# Patient Record
Sex: FEMALE | Race: BLACK OR AFRICAN AMERICAN | NOT HISPANIC OR LATINO
[De-identification: names, ages, dates, MRNs, and addresses within clinical notes are randomized per-mention and may not be internally consistent; named-entity substitution may affect disease eponyms.]

---

## 2022-03-01 PROBLEM — Z00.00 ENCOUNTER FOR PREVENTIVE HEALTH EXAMINATION: Status: ACTIVE | Noted: 2022-03-01

## 2022-03-04 ENCOUNTER — NON-APPOINTMENT (OUTPATIENT)
Age: 72
End: 2022-03-04

## 2022-03-09 ENCOUNTER — NON-APPOINTMENT (OUTPATIENT)
Age: 72
End: 2022-03-09

## 2022-03-09 ENCOUNTER — APPOINTMENT (OUTPATIENT)
Dept: COLORECTAL SURGERY | Facility: CLINIC | Age: 72
End: 2022-03-09
Payer: MEDICARE

## 2022-03-09 VITALS
SYSTOLIC BLOOD PRESSURE: 149 MMHG | TEMPERATURE: 97.8 F | DIASTOLIC BLOOD PRESSURE: 93 MMHG | HEIGHT: 68 IN | WEIGHT: 150 LBS | BODY MASS INDEX: 22.73 KG/M2 | HEART RATE: 73 BPM

## 2022-03-09 DIAGNOSIS — E03.9 HYPOTHYROIDISM, UNSPECIFIED: ICD-10-CM

## 2022-03-09 DIAGNOSIS — I49.9 CARDIAC ARRHYTHMIA, UNSPECIFIED: ICD-10-CM

## 2022-03-09 DIAGNOSIS — Z86.39 PERSONAL HISTORY OF OTHER ENDOCRINE, NUTRITIONAL AND METABOLIC DISEASE: ICD-10-CM

## 2022-03-09 PROCEDURE — 99203 OFFICE O/P NEW LOW 30 MIN: CPT | Mod: 25

## 2022-03-09 PROCEDURE — 46600 DIAGNOSTIC ANOSCOPY SPX: CPT

## 2022-03-09 RX ORDER — HYDROCORTISONE 25 MG/G
2.5 CREAM TOPICAL
Qty: 1 | Refills: 3 | Status: ACTIVE | COMMUNITY
Start: 2022-03-09 | End: 1900-01-01

## 2022-03-09 RX ORDER — ATORVASTATIN CALCIUM 80 MG/1
TABLET, FILM COATED ORAL
Refills: 0 | Status: ACTIVE | COMMUNITY

## 2022-03-09 NOTE — HISTORY OF PRESENT ILLNESS
[FreeTextEntry1] : 70 y/o F presents to the office for initial evaluation of hemorrhoids. \par PMH hypothyroidism on Synthroid 2/2, ?arrhythmia followed annually by Cards Dr. Moya (no interventions required)\par PSH foot surgery\par \par Pt noticed scant BRB noted on TP approximately 3 months ago in setting of straining if she drank less water. Reports bleeding has been more frequent the last two weeks and felt small pink lump in anal area. Felt slight discomfort  and itching which resolved after manually reducing tissue and cleaning self w/ wet wipe. Admits to one episode of fecal urgency and stool leakage this past week as she wasn't able to get to toilet in time\par \par Denies pain or itching at present.\par \par BH: typically once daily, now 4 times daily for the last 5-6 months. Pt attributes this to changing eating habits and she has avoided refined grains, but now eating more fruits/vegetables and whole grains if she eats \par Denies use of stool softeners, fiber supplements or laxatives\par Reports adequate dietary fiber intake, water, seltzer and coconut water and drinks one 16 oz 1/2 decaf coffee\par \par Denies FMH of colorectal cancer\par Last colonoscopy 6 months w/ Dr. Storey, otherwise normal per patient\par Denies ASA/NSAIDs use in the last 7 days. Takes Turmeric supplement

## 2022-03-09 NOTE — PHYSICAL EXAM
[FreeTextEntry1] : Medical assistant present for duration of physical examination\par \par General no acute distress, alert and oriented\par Psych calm, pleasant demeanor, responding appropriately to questions\par Nonlabored breathing\par Ambulating without assistance\par Skin normal color and pigment, no visible lesions or rashes\par \par Anorectal Exam:\par Inspection no erythema, induration or fluctuance, no skin excoriation, no fissure, small residual external hemorrhoidal tags anterior and posterior without inflammation\par HARRIS nontender, no masses palpated, no blood on gloved finger\par \par Procedure: Anoscopy\par \par Pre procedure Diagnosis: hemorrhoids\par Post procedure Diagnosis: hemorrhoids\par Anesthesia: none\par Estimated blood loss: none\par Specimen: none\par Complications: none\par \par Consent obtained. Anoscopy was performed by passing a lighted anoscope with lubricant jelly into the anal canal and the entire anal mucosal surface was inspected. Findings included no fissure, mild to moderate internal hemorrhoids - most prominent left lateral - without inflammation, no visible masses or lesions in anal canal\par \par Patient tolerated examination and procedure well.\par \par \par

## 2022-03-09 NOTE — ASSESSMENT
[FreeTextEntry1] : Exam findings and diagnosis were discussed at length with patient. \par Recommendations including increased fiber intake, adequate daily hydration, stool softeners as needed, and sitz baths as needed and after bowel movements were discussed.\par Avoid constipation and diarrhea, avoid pushing/straining. Avoid associated triggers - patient thinks spicy foods aggravate her symptoms. \par Recommend hydrocortisone as needed if symptoms return.\par If symptoms persist or worsen, recommend follow up evaluation and possible consideration for office based treatment, such as rubber band ligation.\par All questions answered, patient expressed understanding and is agreeable to this plan.\par

## 2022-08-02 ENCOUNTER — APPOINTMENT (OUTPATIENT)
Dept: RHEUMATOLOGY | Facility: CLINIC | Age: 72
End: 2022-08-02

## 2022-08-02 VITALS
WEIGHT: 154 LBS | HEART RATE: 82 BPM | SYSTOLIC BLOOD PRESSURE: 131 MMHG | TEMPERATURE: 97.5 F | OXYGEN SATURATION: 98 % | HEIGHT: 68 IN | DIASTOLIC BLOOD PRESSURE: 83 MMHG | BODY MASS INDEX: 23.34 KG/M2

## 2022-08-02 DIAGNOSIS — Z83.3 FAMILY HISTORY OF DIABETES MELLITUS: ICD-10-CM

## 2022-08-02 DIAGNOSIS — Z78.9 OTHER SPECIFIED HEALTH STATUS: ICD-10-CM

## 2022-08-02 DIAGNOSIS — Z13.21 ENCOUNTER FOR SCREENING FOR NUTRITIONAL DISORDER: ICD-10-CM

## 2022-08-02 DIAGNOSIS — M85.80 OTHER SPECIFIED DISORDERS OF BONE DENSITY AND STRUCTURE, UNSPECIFIED SITE: ICD-10-CM

## 2022-08-02 DIAGNOSIS — M25.561 PAIN IN RIGHT KNEE: ICD-10-CM

## 2022-08-02 DIAGNOSIS — Z77.22 CONTACT WITH AND (SUSPECTED) EXPOSURE TO ENVIRONMENTAL TOBACCO SMOKE (ACUTE) (CHRONIC): ICD-10-CM

## 2022-08-02 PROCEDURE — 36415 COLL VENOUS BLD VENIPUNCTURE: CPT

## 2022-08-02 PROCEDURE — 99204 OFFICE O/P NEW MOD 45 MIN: CPT | Mod: 25

## 2022-08-02 RX ORDER — AMOXICILLIN 500 MG/1
500 CAPSULE ORAL
Qty: 21 | Refills: 0 | Status: COMPLETED | COMMUNITY
Start: 2022-04-05

## 2022-08-02 RX ORDER — LEVOTHYROXINE SODIUM 0.1 MG/1
100 TABLET ORAL
Qty: 90 | Refills: 0 | Status: ACTIVE | COMMUNITY
Start: 2022-05-30

## 2022-08-02 NOTE — DATA REVIEWED
[FreeTextEntry1] : Bone density May 16, 2022\par Lumbar spine T score -2.1\par left Femoral neck T score -0.3\par Left Total hip T score -0.5\par Right femoral neck T score -0.9\par Right total hip T score -0.6\par \par EXAM:  MRI RIGHT SHOULDER WITHOUT CONTRAST\par \par HISTORY:  Worsening right shoulder pain and limited range of motion.\par \par TECHNIQUE:  Multiplanar, multisequence noncontrast MRI of the right shoulder was obtained on a 3T scanner according to standard protocol.\par \par COMPARISON:  Prior right shoulder MRI of 6/17/2015.\par \par FINDINGS:\par \par Rotator cuff: Intact subscapularis tendon. Small near full-thickness articular surface tear of the distal supraspinatus tendon the lateral acromial margin (series 301, image 13, series 401 images 14-15. Partial-thickness interstitial delaminating tears of the anterior fibers of the supraspinatus tendon more proximally (series 401, image 10). Small moderate grade partial-thickness articular surface tear of the distal infraspinatus tendon just proximal to the greater tuberosity insertion (sagittal series 401, image 14, oblique series 701, image 15). Intact teres minor tendon. No atrophy of the rotator cuff musculature.\par \par Glenohumeral joint: Superior labral tear/SLAP lesion involving the long head biceps tendon anchor. Anteroinferior labral tear generating a small para labral cyst. Posterior/posteroinferior labral tear. Diffuse full-thickness chondral loss involving the humeral head. Localized chondral fissuring along the anterior glenoid fossa. Hypertrophic spurring at the humeral head articular margins. Small glenohumeral joint effusion/synovitis.\par \par Long biceps tendon and rotator cuff interval: Long head biceps tenosynovitis, with the tendon in normal position within the bicipital groove. Normal rotator cuff interval.\par \par Acromioclavicular joint and rotator cuff outlet: Mild AC joint arthrosis and joint synovitis. Laterally downsloping anterior acromion is imaging hypertrophic spurring along the acromial undersurface.\par \par Bursae: Inflammatory changes of the subacromial-subdeltoid bursa. \par \par Osseous structures: Bony cystic changes along the greater tuberosity of the humerus anteriorly. No acute osseous abnormalities.\par \par Other: Normal quadrilateral and axillary spaces. Normal visualized deltoid, pectoralis major and minor, coracobrachialis, teres major, and lateral and long heads of the triceps muscles.\par \par IMPRESSION:  MRI of the right shoulder demonstrates:\par \par 1. Small, near full-thickness tear of the supraspinatus tendon, partial-thickness interstitial delaminating tear of the anterior fibers of the supraspinatus tendon more proximally, small partial-thickness tear of the distal infraspinatus tendon\par 2. Superior labral tear/SLAP lesion involving the long head biceps tendon anchor, anteroinferior labral tear, posterior/posteroinferior labral tear\par 3. Glenohumeral arthrosis including extensive full-thickness chondral loss involving the humeral head\par 4. Long head biceps tenosynovitis\par 5. Inflammatory changes of the subacromial-subdeltoid bursa\par 6. Mild AC joint arthrosis and hypertrophic spurring beneath a laterally downsloping anterior acromion\par 7. Small glenohumeral joint effusion/synovitis\par \par Thank you for the opportunity to participate in the care of this patient.  \par  \par KIM MCKEON MD  - Electronically Signed: 12- 11:16 AM \par Physician to Physician Direct Line is: [phone]\par \par XAM:  MRI LEFT SHOULDER WITHOUT CONTRAST\par \par HISTORY:  Left shoulder pain and limited range of motion.\par \par TECHNIQUE:  Multiplanar, multisequence noncontrast MRI of the left show was obtained on a 3T scanner according to standard protocol.\par \par COMPARISON:  None.\par \par FINDINGS:\par \par Rotator cuff: Intact subscapularis tendon. Partial-thickness interstitial tear of the supraspinatus tendon footprint (coronal series 501, image 13). Low-grade partial-thickness interstitial delaminating tear of the supraspinatus tendon more proximally. Underlying tendinosis and bursal surface fraying of the supraspinatus tendon. Low-grade partial-thickness articular surface tear of the distal infraspinatus tendon just proximal to the greater tuberosity insertion (coronal series 501, image 17). Underlying mild infraspinatus tendinosis. Intact teres minor tendon. No atrophy of the rotator cuff musculature.\par \par Glenohumeral joint: Superior labral tear/SLAP lesion involving the long head biceps tendon anchor and the posterosuperior labrum. Tear of the posteroinferior/inferior labrum. Segments of high-grade glenohumeral chondral loss involving the inferior glenoid and inferomedial humeral head. Minimal glenohumeral intra-articular fluid/synovitis.\par \par Long biceps tendon and rotator cuff interval: Long head biceps tendon in normal position within the bicipital groove. Normal rotator cuff interval.\par \par Acromioclavicular joint and rotator cuff outlet: Mild AC joint arthrosis. Slight lateral downslope of the anterior acromion exhibiting hypertrophic changes along the undersurface.\par \par Bursae: Thickened and inflamed subacromial-subdeltoid bursa. \par \par Osseous structures: Generally normal bone marrow signals. There are no fractures.\par \par Other: Normal quadrilateral and axillary spaces. Normal visualized deltoid, pectoralis major and minor, coracobrachialis, teres major, and lateral and long heads of the triceps muscles.\par \par IMPRESSION:  MRI of the left shoulder demonstrates:\par \par 1. Partial-thickness tears of the supraspinatus and infraspinatus tendons, underlying tendinosis\par 2. Superior labral tear/SLAP lesion involving the long head biceps anchor and posterosuperior labrum, posteroinferior/inferior labral tear\par 3. Segments of high-grade glenohumeral chondral loss\par 4. Inflammatory changes of the subacromial-subdeltoid bursa\par 5. Mild AC joint arthrosis and hypertrophic changes beneath a mildly laterally downsloping anterior acromion.\par \par Thank you for the opportunity to participate in the care of this patient.  \par  \par KIM MCKEON MD  - Electronically Signed: 12- 11:32 AM \par Physician to Physician Direct Line is: [phone]

## 2022-08-02 NOTE — PHYSICAL EXAM
[General Appearance - Alert] : alert [General Appearance - In No Acute Distress] : in no acute distress [General Appearance - Well Nourished] : well nourished [General Appearance - Well Developed] : well developed [General Appearance - Well-Appearing] : healthy appearing [Sclera] : the sclera and conjunctiva were normal [Respiration, Rhythm And Depth] : normal respiratory rhythm and effort [Exaggerated Use Of Accessory Muscles For Inspiration] : no accessory muscle use [Edema] : there was no peripheral edema [Abnormal Walk] : normal gait [Musculoskeletal - Swelling] : no joint swelling seen [] : no rash [Oriented To Time, Place, And Person] : oriented to person, place, and time [Impaired Insight] : insight and judgment were intact [Affect] : the affect was normal [Mood] : the mood was normal [FreeTextEntry1] : No psoriasis

## 2022-08-02 NOTE — HISTORY OF PRESENT ILLNESS
[FreeTextEntry1] : Patient referred by Dr. Storey\par \par Right shoulder with decreased ROM, can actively lift the right shoulder to less than 90', then can increase a little bit more passively with assistance of the left upper extremity\par Symptoms present for the past  two months, although likely present for longer period of time as had MRI in 2020 for the right shoulder as well.\par Had issues with right knee, seen by orthopedist, but improved with PT, did not need surgery\par \par Right shoulder has had issues previously, follows with orthopedist, treated with steroid injections intermittently.\par Years ago was at gym, felt overstretched the shoulders and developed pain in the bilateral shoulders \par Patient is right-hand dominant, trying to do more things with the left hand but limited\par Has received injections for the past \par Had steroid injection in the right shoulder, by orthopedist, patient is unclear when but in the past few months, calendar at home, unclear when had injection, but did not feel benefit and was told by orthopedist unable to administer further injections in the right shoulder or the right knee.\par Injection for the knee in addition to physical therapy helped no longer needed surgery, but injection for the shoulder did not provide relief, has not tried physical therapy to date\par \par Orthopedist: Dr. Medina [address] \par [phone]\par \par Bone density reviewed with patient\par Walks for exercises, ellipitcal \par Retired from the [employer] department\par \par No PT for the shoulder\par No medications for the pain\par \par Tried something online for shoulder and knee\par \par history of toe fracture after falling, s/p surgery more than 15 years ago\par Takes vitamins\par Folic acid, b vitamins, d vitamin, vitamin c\par No history of covid\par Healthier, lost weight about five years\par Exercise regularly, walking and elliptical\par No falls\par \par No personal history of smoking\[place] [facility] [place] [facility] [employer], was around smoke as child

## 2022-08-02 NOTE — ASSESSMENT
[FreeTextEntry1] : 71 year old woman with right shoulder pain with greatly reduced range of motion referred for evaluation.  Patient with decreased ROM of the right shoulder with previous MRI completed in 2020 with RTC tears and SLAP lesion and glenohumeral arthrosis with full thickness chondral loss.  MRI appears degenerative in etiology, however given involvement of the left shoulder as well, will obtain labs today to evaluate for possible underlying inflammatory arthritis, will check RF, CCP, ESR, CRP,  CBC, and CMP.  WIll repeat MRI of the right shoulder for progression, would follow up with orthopedist as well, may be candidate for surgical intervention at this time given degree of disability of the right upper extremity.  Reviewed bone density osteopenia noted, with FRAX score of 6/27% and 0.71% respectively, will check vitamin D level today as well. Continue weight bearing exercises as well as calcium and vitamin D supplementation, fall precautions discussed. Further management pending results.

## 2022-08-03 LAB
25(OH)D3 SERPL-MCNC: 49.5 NG/ML
ALBUMIN SERPL ELPH-MCNC: 4.9 G/DL
ALP BLD-CCNC: 87 U/L
ALT SERPL-CCNC: 20 U/L
ANION GAP SERPL CALC-SCNC: 11 MMOL/L
AST SERPL-CCNC: 23 U/L
BASOPHILS # BLD AUTO: 0.03 K/UL
BASOPHILS NFR BLD AUTO: 0.8 %
BILIRUB SERPL-MCNC: 0.5 MG/DL
BUN SERPL-MCNC: 13 MG/DL
CALCIUM SERPL-MCNC: 9.4 MG/DL
CHLORIDE SERPL-SCNC: 101 MMOL/L
CO2 SERPL-SCNC: 27 MMOL/L
CREAT SERPL-MCNC: 0.9 MG/DL
CRP SERPL-MCNC: <3 MG/L
EGFR: 68 ML/MIN/1.73M2
EOSINOPHIL # BLD AUTO: 0.06 K/UL
EOSINOPHIL NFR BLD AUTO: 1.5 %
ERYTHROCYTE [SEDIMENTATION RATE] IN BLOOD BY WESTERGREN METHOD: 27 MM/HR
GLUCOSE SERPL-MCNC: 88 MG/DL
HCT VFR BLD CALC: 44.2 %
HGB BLD-MCNC: 14.7 G/DL
IMM GRANULOCYTES NFR BLD AUTO: 0.5 %
LYMPHOCYTES # BLD AUTO: 1.09 K/UL
LYMPHOCYTES NFR BLD AUTO: 27.3 %
MAN DIFF?: NORMAL
MCHC RBC-ENTMCNC: 29.8 PG
MCHC RBC-ENTMCNC: 33.3 GM/DL
MCV RBC AUTO: 89.7 FL
MONOCYTES # BLD AUTO: 0.46 K/UL
MONOCYTES NFR BLD AUTO: 11.5 %
NEUTROPHILS # BLD AUTO: 2.33 K/UL
NEUTROPHILS NFR BLD AUTO: 58.4 %
PLATELET # BLD AUTO: 293 K/UL
POTASSIUM SERPL-SCNC: 4.3 MMOL/L
PROT SERPL-MCNC: 7.4 G/DL
RBC # BLD: 4.93 M/UL
RBC # FLD: 14.7 %
RHEUMATOID FACT SER QL: <10 IU/ML
SODIUM SERPL-SCNC: 139 MMOL/L
WBC # FLD AUTO: 3.99 K/UL

## 2022-08-08 LAB
CCP AB SER IA-ACNC: <8 UNITS
RF+CCP IGG SER-IMP: NEGATIVE

## 2022-09-01 ENCOUNTER — APPOINTMENT (OUTPATIENT)
Dept: RHEUMATOLOGY | Facility: CLINIC | Age: 72
End: 2022-09-01

## 2022-09-01 VITALS
TEMPERATURE: 97.4 F | OXYGEN SATURATION: 98 % | HEART RATE: 61 BPM | WEIGHT: 154 LBS | DIASTOLIC BLOOD PRESSURE: 86 MMHG | HEIGHT: 68 IN | BODY MASS INDEX: 23.34 KG/M2 | SYSTOLIC BLOOD PRESSURE: 154 MMHG

## 2022-09-01 DIAGNOSIS — M25.512 PAIN IN LEFT SHOULDER: ICD-10-CM

## 2022-09-01 DIAGNOSIS — G89.29 PAIN IN RIGHT SHOULDER: ICD-10-CM

## 2022-09-01 DIAGNOSIS — M25.619 STIFFNESS OF UNSPECIFIED SHOULDER, NOT ELSEWHERE CLASSIFIED: ICD-10-CM

## 2022-09-01 DIAGNOSIS — M25.511 PAIN IN RIGHT SHOULDER: ICD-10-CM

## 2022-09-01 DIAGNOSIS — G89.29 PAIN IN LEFT SHOULDER: ICD-10-CM

## 2022-09-01 PROCEDURE — 99213 OFFICE O/P EST LOW 20 MIN: CPT

## 2022-09-01 RX ORDER — LEVOTHYROXINE SODIUM 137 UG/1
TABLET ORAL
Refills: 0 | Status: DISCONTINUED | COMMUNITY
End: 2022-09-01

## 2022-09-01 RX ORDER — MELOXICAM 15 MG/1
15 TABLET ORAL
Qty: 14 | Refills: 1 | Status: ACTIVE | COMMUNITY
Start: 2022-09-01 | End: 1900-01-01

## 2022-09-01 NOTE — PHYSICAL EXAM
[General Appearance - Alert] : alert [General Appearance - In No Acute Distress] : in no acute distress [General Appearance - Well-Appearing] : healthy appearing [Sclera] : the sclera and conjunctiva were normal [Examination Of The Oral Cavity] : the lips and gums were normal [Oropharynx] : the oropharynx was normal [Neck Appearance] : the appearance of the neck was normal [No Spinal Tenderness] : no spinal tenderness [Abnormal Walk] : normal gait [] : no rash [Skin Lesions] : no skin lesions [Oriented To Time, Place, And Person] : oriented to person, place, and time [Impaired Insight] : insight and judgment were intact [Affect] : the affect was normal [FreeTextEntry1] : Decreased range of motion of the right shoulder in all directions, no other joints involved at this time.  Crepitus of the bilateral knees right more than left

## 2022-09-01 NOTE — HISTORY OF PRESENT ILLNESS
[FreeTextEntry1] : Patient referred by Dr. Storey\par \par August 2, 2022\par Right shoulder with decreased ROM, can actively lift the right shoulder to less than 90', then can increase a little bit more passively with assistance of the left upper extremity\par Symptoms present for the past  two months, although likely present for longer period of time as had MRI in 2020 for the right shoulder as well.\par Had issues with right knee, seen by orthopedist, but improved with PT, did not need surgery\par \par Right shoulder has had issues previously, follows with orthopedist, treated with steroid injections intermittently.\par Years ago was at gym, felt overstretched the shoulders and developed pain in the bilateral shoulders \par Patient is right-hand dominant, trying to do more things with the left hand but limited\par Has received injections for the past \par Had steroid injection in the right shoulder, by orthopedist, patient is unclear when but in the past few months, calendar at home, unclear when had injection, but did not feel benefit and was told by orthopedist unable to administer further injections in the right shoulder or the right knee.\par Injection for the knee in addition to physical therapy helped no longer needed surgery, but injection for the shoulder did not provide relief, has not tried physical therapy to date\par \par Orthopedist: Dr. Medina [address] \par [phone]\par \par Bone density reviewed with patient\par Walks for exercises, ellipitcal \par Retired from the [employer] department\par \par No PT for the shoulder\par No medications for the pain\par \par Tried something online for shoulder and knee\par \par history of toe fracture after falling, s/p surgery more than 15 years ago\par Takes vitamins\par Folic acid, b vitamins, d vitamin, vitamin c\par No history of covid\par Healthier, lost weight about five years\par Exercise regularly, walking and elliptical\par No falls\par \par No personal history of smoking\[facility], was around smoke as child\par \par September 1, 2022\par Patient returns for follow up \par Patient with right shoulder pain\par Reviewed MRI results with patient\par Reviewed blood test results with patient\par Patient was recommended for surgery by orthopedist, but would like to hold off\par Pain in the right shoulder limits sleeping and limits activities with the right shoulder as range of motion greatly limited\par Discussed anti-inflammatories, trial of meloxicam\par Patient would like to try physical therapy as greatly helped with the right knee pain\par \par

## 2022-09-01 NOTE — DATA REVIEWED
[FreeTextEntry1] : Bone density May 16, 2022\par Lumbar spine T score -2.1\par left Femoral neck T score -0.3\par Left Total hip T score -0.5\par Right femoral neck T score -0.9\par Right total hip T score -0.6\par \par EXAM:  MRI RIGHT SHOULDER WITHOUT CONTRAST\par \par HISTORY:  Worsening right shoulder pain and limited range of motion.\par \par TECHNIQUE:  Multiplanar, multisequence noncontrast MRI of the right shoulder was obtained on a 3T scanner according to standard protocol.\par \par COMPARISON:  Prior right shoulder MRI of 6/17/2015.\par \par FINDINGS:\par \par Rotator cuff: Intact subscapularis tendon. Small near full-thickness articular surface tear of the distal supraspinatus tendon the lateral acromial margin (series 301, image 13, series 401 images 14-15. Partial-thickness interstitial delaminating tears of the anterior fibers of the supraspinatus tendon more proximally (series 401, image 10). Small moderate grade partial-thickness articular surface tear of the distal infraspinatus tendon just proximal to the greater tuberosity insertion (sagittal series 401, image 14, oblique series 701, image 15). Intact teres minor tendon. No atrophy of the rotator cuff musculature.\par \par Glenohumeral joint: Superior labral tear/SLAP lesion involving the long head biceps tendon anchor. Anteroinferior labral tear generating a small para labral cyst. Posterior/posteroinferior labral tear. Diffuse full-thickness chondral loss involving the humeral head. Localized chondral fissuring along the anterior glenoid fossa. Hypertrophic spurring at the humeral head articular margins. Small glenohumeral joint effusion/synovitis.\par \par Long biceps tendon and rotator cuff interval: Long head biceps tenosynovitis, with the tendon in normal position within the bicipital groove. Normal rotator cuff interval.\par \par Acromioclavicular joint and rotator cuff outlet: Mild AC joint arthrosis and joint synovitis. Laterally downsloping anterior acromion is imaging hypertrophic spurring along the acromial undersurface.\par \par Bursae: Inflammatory changes of the subacromial-subdeltoid bursa. \par \par Osseous structures: Bony cystic changes along the greater tuberosity of the humerus anteriorly. No acute osseous abnormalities.\par \par Other: Normal quadrilateral and axillary spaces. Normal visualized deltoid, pectoralis major and minor, coracobrachialis, teres major, and lateral and long heads of the triceps muscles.\par \par IMPRESSION:  MRI of the right shoulder demonstrates:\par \par 1. Small, near full-thickness tear of the supraspinatus tendon, partial-thickness interstitial delaminating tear of the anterior fibers of the supraspinatus tendon more proximally, small partial-thickness tear of the distal infraspinatus tendon\par 2. Superior labral tear/SLAP lesion involving the long head biceps tendon anchor, anteroinferior labral tear, posterior/posteroinferior labral tear\par 3. Glenohumeral arthrosis including extensive full-thickness chondral loss involving the humeral head\par 4. Long head biceps tenosynovitis\par 5. Inflammatory changes of the subacromial-subdeltoid bursa\par 6. Mild AC joint arthrosis and hypertrophic spurring beneath a laterally downsloping anterior acromion\par 7. Small glenohumeral joint effusion/synovitis\par \par Thank you for the opportunity to participate in the care of this patient.  \par  \par KIM MCKEON MD  - Electronically Signed: 12- 11:16 AM \par Physician to Physician Direct Line is: [phone]\par \par XAM:  MRI LEFT SHOULDER WITHOUT CONTRAST\par \par HISTORY:  Left shoulder pain and limited range of motion.\par \par TECHNIQUE:  Multiplanar, multisequence noncontrast MRI of the left show was obtained on a 3T scanner according to standard protocol.\par \par COMPARISON:  None.\par \par FINDINGS:\par \par Rotator cuff: Intact subscapularis tendon. Partial-thickness interstitial tear of the supraspinatus tendon footprint (coronal series 501, image 13). Low-grade partial-thickness interstitial delaminating tear of the supraspinatus tendon more proximally. Underlying tendinosis and bursal surface fraying of the supraspinatus tendon. Low-grade partial-thickness articular surface tear of the distal infraspinatus tendon just proximal to the greater tuberosity insertion (coronal series 501, image 17). Underlying mild infraspinatus tendinosis. Intact teres minor tendon. No atrophy of the rotator cuff musculature.\par \par Glenohumeral joint: Superior labral tear/SLAP lesion involving the long head biceps tendon anchor and the posterosuperior labrum. Tear of the posteroinferior/inferior labrum. Segments of high-grade glenohumeral chondral loss involving the inferior glenoid and inferomedial humeral head. Minimal glenohumeral intra-articular fluid/synovitis.\par \par Long biceps tendon and rotator cuff interval: Long head biceps tendon in normal position within the bicipital groove. Normal rotator cuff interval.\par \par Acromioclavicular joint and rotator cuff outlet: Mild AC joint arthrosis. Slight lateral downslope of the anterior acromion exhibiting hypertrophic changes along the undersurface.\par \par Bursae: Thickened and inflamed subacromial-subdeltoid bursa. \par \par Osseous structures: Generally normal bone marrow signals. There are no fractures.\par \par Other: Normal quadrilateral and axillary spaces. Normal visualized deltoid, pectoralis major and minor, coracobrachialis, teres major, and lateral and long heads of the triceps muscles.\par \par IMPRESSION:  MRI of the left shoulder demonstrates:\par \par 1. Partial-thickness tears of the supraspinatus and infraspinatus tendons, underlying tendinosis\par 2. Superior labral tear/SLAP lesion involving the long head biceps anchor and posterosuperior labrum, posteroinferior/inferior labral tear\par 3. Segments of high-grade glenohumeral chondral loss\par 4. Inflammatory changes of the subacromial-subdeltoid bursa\par 5. Mild AC joint arthrosis and hypertrophic changes beneath a mildly laterally downsloping anterior acromion.\par \par Thank you for the opportunity to participate in the care of this patient.  \par  \par KIM MCKEON MD  - Electronically Signed: 12- 11:32 AM \par Physician to Physician Direct Line is: [phone]

## 2022-09-01 NOTE — REVIEW OF SYSTEMS
[Joint Pain] : joint pain [Joint Stiffness] : joint stiffness [Negative] : Heme/Lymph [FreeTextEntry9] : Right shoulder

## 2022-09-01 NOTE — ASSESSMENT
[FreeTextEntry1] : 71 year old woman with right shoulder pain with greatly reduced range of motion returns for follow-up.  Patient with decreased ROM of the right shoulder with previous MRI completed in 2020 with RTC tears and SLAP lesion and glenohumeral arthrosis with full thickness chondral loss.  Previous MRI appeared degenerative in etiology, repeat MRI shows interval progression of a moderate grade partial interstitial tear of the infraspinatus tendon at the anterior insertion mild superior rotator cuff tendinosis.  With moderate glenohumeral joint osteoarthritis with diffuse labral degeneration and degenerative tearing.  Also noted AC joint mild OA as well as mild subacromial subdeltoid bursitis.  Blood test without evidence of an inflammatory arthritis at this time, rheumatoid factor and CCP negative as well as ESR and CRP in the normal range.  Would recommend follow up with orthopedist as well, may be candidate for surgical intervention at this time given degree of disability of the right upper extremity.  Trial of meloxicam 15 mg daily with food, patient would like to try physical therapy as well prior to surgical intervention.  We will follow-up in 4 weeks to reevaluate or sooner as needed. Again reviewed bone density osteopenia noted, with FRAX score of 6.27% and 0.71% respectively, continue weight bearing exercises as well as calcium and vitamin D supplementation, fall precautions discussed.

## 2023-03-10 ENCOUNTER — RX RENEWAL (OUTPATIENT)
Age: 73
End: 2023-03-10

## 2023-03-10 RX ORDER — HYDROCORTISONE 25 MG/G
2.5 CREAM TOPICAL
Qty: 28 | Refills: 0 | Status: ACTIVE | COMMUNITY
Start: 2023-03-10 | End: 1900-01-01

## 2023-05-02 ENCOUNTER — NON-APPOINTMENT (OUTPATIENT)
Age: 73
End: 2023-05-02

## 2023-05-04 ENCOUNTER — APPOINTMENT (OUTPATIENT)
Dept: COLORECTAL SURGERY | Facility: CLINIC | Age: 73
End: 2023-05-04
Payer: MEDICARE

## 2023-05-04 VITALS
HEIGHT: 68 IN | DIASTOLIC BLOOD PRESSURE: 87 MMHG | SYSTOLIC BLOOD PRESSURE: 145 MMHG | TEMPERATURE: 97.3 F | BODY MASS INDEX: 23.95 KG/M2 | WEIGHT: 158 LBS | HEART RATE: 86 BPM

## 2023-05-04 DIAGNOSIS — K64.8 OTHER HEMORRHOIDS: ICD-10-CM

## 2023-05-04 PROCEDURE — 46600 DIAGNOSTIC ANOSCOPY SPX: CPT

## 2023-05-04 NOTE — HISTORY OF PRESENT ILLNESS
[FreeTextEntry1] : 73 yo F presents for f/u hemorrhoids\par Last colonoscopy ~ 2021 w/ Dr. Storey, otherwise normal per patient\par \par Last seen for initial consultation 3/9/22 w/ reported h/o scant BRB on TP 3 months prior in setting of straining or decreased water intake. More recently had experienced small pink lump associated w/ slight discomfort and itching, improved w/ manual reduction.\par Exam noted small residual external hemorrhoidal tags at anterior and posterior w/o inflammation. Anoscopy noted mild to moderate internal hemorrhoids, most prominent at left lateral, no inflammation.\par \par Pt advised on optimizing bowel habits and increasing dietary fiber and fluid intake, avoidance of foods that trigger constipation. Prescribed HC 2.5% for PRN use, consider rubber band ligation if symptoms worsen.\par \par Pt experienced flare of hemorrhoids last week in setting of likely constipation and straining.  Also had 2 episodes of explosive diarrhea, admits she had been eating lots of cabbage, kale). Pt saw three large painful "balloons" and rectum appeared very swollen. She BRB on TP initially however it has since resolved. Pt used HC cream BID with good effect and opted to keep the appointment for further evaluation.\par Also admits to increased flatulence as well. \par Moving bowels regularly now. Has not been taking Metamucil capsules as she felt it worsened acid reflux.\par Denies ASA/NSAID use

## 2023-05-04 NOTE — ASSESSMENT
[FreeTextEntry1] : Hemorrhoidal flare after episode of diarrhea.\par Resolved with supportive care and hydrocortisone.\par Currently asymptomatic with no acute findings on exam.\par Avoid constipation/diarrhea. Hydrocortisone prn. F/u if symptoms return. \par All questions were answered, patient expressed understanding, and is agreeable to this plan.\par

## 2023-05-04 NOTE — PHYSICAL EXAM
[FreeTextEntry1] : Medical assistant present for duration of physical examination\par \par General no acute distress, alert and oriented\par Psych calm, pleasant, in good spirits\par Nonlabored breathing\par Ambulating without assistance\par Skin no visible skin changes\par \par Anorectal Exam:\par Inspection no cellulitis or fluctuance, no fissure, small residual external hemorrhoidal tags anterior and posterior without inflammation\par HARRIS nontender, no masses palpated, no blood on gloved finger\par \par Procedure: Anoscopy\par \par Pre procedure Diagnosis: hemorrhoids\par Post procedure Diagnosis: hemorrhoids\par Anesthesia: none\par Estimated blood loss: none\par Specimen: none\par Complications: none\par \par Consent obtained. Anoscopy was performed by passing a lighted anoscope with lubricant jelly into the anal canal and the entire anal mucosal surface was inspected. Findings included no fissure, mild internal hemorrhoids \par \par Patient tolerated examination and procedure well.\par \par \par

## 2024-12-17 ENCOUNTER — APPOINTMENT (OUTPATIENT)
Dept: COLORECTAL SURGERY | Facility: CLINIC | Age: 74
End: 2024-12-17

## 2025-09-16 ENCOUNTER — APPOINTMENT (OUTPATIENT)
Dept: GASTROENTEROLOGY | Facility: CLINIC | Age: 75
End: 2025-09-16
Payer: MEDICARE

## 2025-09-16 VITALS
HEART RATE: 61 BPM | DIASTOLIC BLOOD PRESSURE: 94 MMHG | BODY MASS INDEX: 23.79 KG/M2 | WEIGHT: 157 LBS | RESPIRATION RATE: 14 BRPM | HEIGHT: 68 IN | TEMPERATURE: 96.4 F | SYSTOLIC BLOOD PRESSURE: 164 MMHG | OXYGEN SATURATION: 98 %

## 2025-09-16 DIAGNOSIS — K31.89 OTHER DISEASES OF STOMACH AND DUODENUM: ICD-10-CM

## 2025-09-16 DIAGNOSIS — Z01.818 ENCOUNTER FOR OTHER PREPROCEDURAL EXAMINATION: ICD-10-CM

## 2025-09-16 PROCEDURE — 99204 OFFICE O/P NEW MOD 45 MIN: CPT

## 2025-09-16 RX ORDER — METOPROLOL TARTRATE 75 MG/1
TABLET, FILM COATED ORAL
Refills: 0 | Status: ACTIVE | COMMUNITY